# Patient Record
Sex: MALE | URBAN - METROPOLITAN AREA
[De-identification: names, ages, dates, MRNs, and addresses within clinical notes are randomized per-mention and may not be internally consistent; named-entity substitution may affect disease eponyms.]

---

## 2022-05-16 ENCOUNTER — TELEPHONE (OUTPATIENT)
Dept: INTERNAL MEDICINE CLINIC | Facility: CLINIC | Age: 52
End: 2022-05-16

## 2022-09-07 ENCOUNTER — TELEPHONE (OUTPATIENT)
Dept: INTERNAL MEDICINE CLINIC | Facility: CLINIC | Age: 52
End: 2022-09-07

## 2023-02-08 ENCOUNTER — TELEPHONE (OUTPATIENT)
Dept: FAMILY MEDICINE CLINIC | Facility: CLINIC | Age: 53
End: 2023-02-08

## 2023-02-08 NOTE — TELEPHONE ENCOUNTER
Patient is calling and is going to be going into a some form of a assistant living home. He is going to be a new patient and he wants to know if he can have a form filled out by Mica Gautam for him to okay him to be put in the home after being seen by  for the first time or will he need to be seen more than once? Will you be able to call me and let me know the answer to this so that I can help him? He wants me to call him back with the answer to this and assist him with booking his appt.     Ext: [id number]

## 2023-02-08 NOTE — TELEPHONE ENCOUNTER
Spoke to patient, explained to him that we can fill out forms at his visit but if additional test/labs is required the forms will be incomplete until all requirements are completed. Patient verbalized understanding and will make appointment when he is ready to come in.

## 2023-02-21 ENCOUNTER — TELEPHONE (OUTPATIENT)
Dept: FAMILY MEDICINE CLINIC | Facility: CLINIC | Age: 53
End: 2023-02-21

## 2023-02-21 NOTE — TELEPHONE ENCOUNTER
Received  Housing forms which called [employer] at [phone]  to fax to [phone], spoke with Sylvester Drake, forms were previously faxed to wrong number,       Faxed successfully forms to [facility] at [phone] forms sent to scan       Unable to reach pt to inform forms were faxed

## 2023-03-14 ENCOUNTER — MED REC SCAN ONLY (OUTPATIENT)
Dept: FAMILY MEDICINE CLINIC | Facility: CLINIC | Age: 53
End: 2023-03-14

## 2023-04-07 ENCOUNTER — TELEPHONE (OUTPATIENT)
Dept: INTERNAL MEDICINE CLINIC | Facility: CLINIC | Age: 53
End: 2023-04-07

## 2023-08-15 ENCOUNTER — TELEPHONE (OUTPATIENT)
Dept: FAMILY MEDICINE CLINIC | Facility: CLINIC | Age: 53
End: 2023-08-15

## 2023-11-01 ENCOUNTER — TELEPHONE (OUTPATIENT)
Dept: FAMILY MEDICINE CLINIC | Facility: CLINIC | Age: 53
End: 2023-11-01

## 2025-05-17 ENCOUNTER — HOSPITAL ENCOUNTER (EMERGENCY)
Facility: HOSPITAL | Age: 55
Discharge: HOME | End: 2025-05-17
Attending: EMERGENCY MEDICINE
Payer: MEDICARE

## 2025-05-17 VITALS
HEIGHT: 69 IN | TEMPERATURE: 98 F | SYSTOLIC BLOOD PRESSURE: 130 MMHG | HEART RATE: 75 BPM | RESPIRATION RATE: 20 BRPM | OXYGEN SATURATION: 98 % | DIASTOLIC BLOOD PRESSURE: 68 MMHG | BODY MASS INDEX: 20.73 KG/M2 | WEIGHT: 140 LBS

## 2025-05-17 DIAGNOSIS — S81.851A DOG BITE OF RIGHT LOWER LEG, INITIAL ENCOUNTER: Primary | ICD-10-CM
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
## 2025-05-17 PROCEDURE — 99283 EMERGENCY DEPT VISIT LOW MDM: CPT

## 2025-05-17 PROCEDURE — 12005 RPR S/N/A/GEN/TRK12.6-20.0CM: CPT

## 2025-05-17 PROCEDURE — 99284 EMERGENCY DEPT VISIT MOD MDM: CPT

## 2025-05-17 RX ORDER — IBUPROFEN 600 MG/1
600 TABLET, FILM COATED ORAL EVERY 8 HOURS PRN
Qty: 30 TABLET | Refills: 0 | Status: SHIPPED | OUTPATIENT
Start: 2025-05-17 | End: 2025-05-24

## 2025-05-18 NOTE — ED PROVIDER NOTES
Patient Seen in: [facility] Emergency Department    History     Chief Complaint   Patient presents with    Leg or Foot Injury       HPI    History is provided by patient/independent historian: Patient, patient's family  54 year old male with history of hydrocephalus with shunt, resultant right-sided weakness, here with complaints of dog bite to the right leg.  Patient states that he was meeting in a forest with his friends on a dog was there.  He went to go pet him when the dog bit his leg.  He initially went home per patient's family, but when she looked at the wound, she decided to bring him in for further evaluation.  No decreased range of motion.  No numbness.    History reviewed. Past Medical History[1]      History reviewed. Past Surgical History[2]      Home Medications reviewed :  Prescriptions Prior to Admission[3]      History reviewed. Social Hx on file[4]      ROS  Review of Systems   Respiratory:  Negative for shortness of breath.    Cardiovascular:  Negative for chest pain.   Skin:  Positive for wound.   All other systems reviewed and are negative.     All other pertinent organ systems are reviewed and are negative.      Physical Exam     ED Triage Vitals [05/17/25 2029]   /88   Pulse 81   Resp 20   Temp 98 °F (36.7 °C)   Temp src Temporal   SpO2 98 %   O2 Device None (Room air)     Vital signs reviewed.      Physical Exam  Vitals and nursing note reviewed.   Cardiovascular:      Pulses: Normal pulses.   Pulmonary:      Effort: No respiratory distress.   Abdominal:      General: There is no distension.   Musculoskeletal:        Legs:    Neurological:      Mental Status: He is alert.         ED Course       Labs:   Labs Reviewed - No data to display      My EKG Interpretation:   As reviewed and Interpreted by me      Imaging Results Available and Reviewed while in ED:   No results found.    Decision rules/scores evaluated: none      Diagnostic labs/tests considered but not ordered: CBC,  BMP, aerobic culture, tib/fib XR    ED Medications Administered: Medications - No data to display             MDM       Medical Decision Making      Differential Diagnosis: After obtaining the patient's history, performing the physical exam and reviewing the diagnostics, multiple initial diagnoses were considered based on the presenting problem including laceration, fracture, tendon injury, nerve injury    External document review: I personally reviewed available external medical records for any recent pertinent discharge summaries, testing, and procedures - the findings are as follows: 2/21/23 visit with Dr. Chisholm for establishment of care    Complicating Factors: The patient already  has a past medical history of History of brain shunt (1987), Hydrocephalus (HCC), and Trauma. to contribute to the complexity of this ED evaluation.    Procedures performed:   PROCEDURE:  Laceration Repair  : Aman Munguia MD  Length:  5in  Location:  R leg    The patient / caregivers were apprised of diagnostic / treatment options including alternate modes of care, in addition to risks and benefits, for this medical condition. Based on this discussion the patient / caregiver agree with this chosen diagnostic and treatment plan and verbal consent was obtained.    Timeout was performed and the correct patient, site, location was confirmed prior to initiation.  Sterile prep and drape was preformed.  Local analgesia was obtained using lidocaine 1%.  The wound was thoroughly cleansed, irrigated, and explored.  No evidence of foreign body was noted.  I discussed with the patient that there is always a risk of undetected foreign body and if redness, swelling, fever, or increased pain, to return to the ED.  The wound was closed with eight 4-0 prolene sutures with good approximation.  The patient was neurovascularly intact after closure.  No complications were noted.  Sterile dressing applied.    Patient instructed to follow up with  their PMD or return to the ED for suture removal in 10 days.    PROCEDURE:  Laceration Repair  : Aman Munguia MD  Length:  1cm  Location:  R leg    The patient / caregivers were apprised of diagnostic / treatment options including alternate modes of care, in addition to risks and benefits, for this medical condition. Based on this discussion the patient / caregiver agree with this chosen diagnostic and treatment plan and verbal consent was obtained.    Timeout was performed and the correct patient, site, location was confirmed prior to initiation.  Sterile prep and drape was preformed.  Local analgesia was obtained using lidocaine 1%.  The wound was thoroughly cleansed, irrigated, and explored.  No evidence of foreign body was noted.  I discussed with the patient that there is always a risk of undetected foreign body and if redness, swelling, fever, or increased pain, to return to the ED.  The wound was closed with one 4-0 prolene sutures with good approximation.  The patient was neurovascularly intact after closure.  No complications were noted.  Sterile dressing applied.    Patient instructed to follow up with their PMD or return to the ED for suture removal in 10 days.    PROCEDURE:  Laceration Repair  : Aman Munguia MD  Length:  1in  Location:  R leg    The patient / caregivers were apprised of diagnostic / treatment options including alternate modes of care, in addition to risks and benefits, for this medical condition. Based on this discussion the patient / caregiver agree with this chosen diagnostic and treatment plan and verbal consent was obtained.    Timeout was performed and the correct patient, site, location was confirmed prior to initiation.  Sterile prep and drape was preformed.  Local analgesia was obtained using lidocaine 1%.  The wound was thoroughly cleansed, irrigated, and explored.  No evidence of foreign body was noted.  I discussed with the patient that there is always a risk  of undetected foreign body and if redness, swelling, fever, or increased pain, to return to the ED.  The wound was closed with two 4-0 nylon sutures with good approximation.  The patient was neurovascularly intact after closure.  No complications were noted.  Sterile dressing applied.    Patient instructed to follow up with their PMD or return to the ED for suture removal in 10 days.    PROCEDURE:  Laceration Repair  : Aman Munguia MD  Length:  2cm  Location:  R leg    The patient / caregivers were apprised of diagnostic / treatment options including alternate modes of care, in addition to risks and benefits, for this medical condition. Based on this discussion the patient / caregiver agree with this chosen diagnostic and treatment plan and verbal consent was obtained.    Timeout was performed and the correct patient, site, location was confirmed prior to initiation.  Sterile prep and drape was preformed.  Local analgesia was obtained using lidocaine 1%.  The wound was thoroughly cleansed, irrigated, and explored.  No evidence of foreign body was noted.  I discussed with the patient that there is always a risk of undetected foreign body and if redness, swelling, fever, or increased pain, to return to the ED.  The wound was closed with one 4-0 nylon sutures with good approximation.  The patient was neurovascularly intact after closure.  No complications were noted.  Sterile dressing applied.    Patient instructed to follow up with their PMD or return to the ED for suture removal in 10 days.    Discussed management with physician/appropriate source: none    Considered admission/deescalation of care for: none    Social determinants of health affecting patient care: none    Prescription medications considered: augmentin, prescription strength ibuprofen, discussed continuing current medication regimen    The patient requires continuous monitoring for: dog bite    Shared decision making: discussed possible  admission            Disposition and Plan     Clinical Impression:  1. Dog bite of right lower leg, initial encounter        Disposition:  Discharge    Follow-up:  [facility]  [address] [facility] [address]  [phone]    Follow up in 10 day(s)  For suture removal      Medications Prescribed:  Current Discharge Medication List        START taking these medications    Details   amoxicillin clavulanate 875-125 MG Oral Tab Take 1 tablet by mouth 2 (two) times daily for 10 days.  Qty: 20 tablet, Refills: 0      !! ibuprofen 600 MG Oral Tab Take 1 tablet (600 mg total) by mouth every 8 (eight) hours as needed for Pain or Fever.  Qty: 30 tablet, Refills: 0       !! - Potential duplicate medications found. Please discuss with provider.                         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     [2]   Past Surgical History:  Procedure Laterality Date    Other surgical history  01/01/1987    TRACHEOTOMY    Other surgical history      surgery on legs    Other surgical history      surgery on head    Other surgical history      surgery on both feet   [3] (Not in a hospital admission)   [4]   Social History  Socioeconomic History    Marital status: Single   Tobacco Use    Smoking status: Every Day   Substance and Sexual Activity    Alcohol use: Yes     Comment: occ 3-4 a year     Drug use: No   Other Topics Concern    Caffeine Concern Yes     Comment: Soda 6 cups daily

## 2025-05-18 NOTE — ED INITIAL ASSESSMENT (HPI)
Large dog bite to the right lower leg, looks to be a large chunk of skin removed and possible bone exposure. Unknown vaccination status of dog

## 2025-05-18 NOTE — ED QUICK NOTES
Patient to Ct room 34 from  d/t dog bite. Patient states he went walking in the woods because he thought there was a \"hangout\" there. Patient states a dog bit his right leg. Wound to the right leg, minimal bleeding at this time. Patient able to move his right foot. A&Ox3-4.

## 2025-05-19 ENCOUNTER — PATIENT OUTREACH (OUTPATIENT)
Dept: CASE MANAGEMENT | Age: 55
End: 2025-05-19

## 2025-05-19 NOTE — PROGRESS NOTES
NCM attempted to reach the patient to complete a transitions of care call. Left message to call back. [facility] provided direct contact info at [phone].

## 2025-05-20 NOTE — PROGRESS NOTES
Transitions of Care Navigation  Discharge Date: 25  Contact Date: 2025    Transitions of Care Assessment:  BRYNN Initial Assessment    General:  Assessment completed with: Patient  Patient Subjective: Spoke with patient. Patient states he is doing good. Patient states the dog bite on the right lower leg is covered with a sterile dressing. The patient states he plans on removing the dressing today and showering. The patient denies any pain, fevers, or chills. Tolerating his antibiotic and taking Ibuprofen as needed. The patient denies chest pain, shortness of breath, nausea, vomiting, diarrhea. The patient states his girlfriend is home to assist with monitoring the wound and changing the dressing. Patient confirms he was provided wound care supplies by ED provider. The patient denies any questions or concerns.  Chief Complaint: Dog bite of right lower leg, initial encounter  Verify patient name and  with patient/ caregiver: Yes    Hospital Stay/Discharge:  Tell me what you understand of why you were in the hospital or emergency department: a dog bite  Prior to leaving the hospital were your Discharge Instructions reviewed with you?: Yes  Did you receive a copy of your written Discharge Instructions?: Yes  What questions do you have about your Discharge Instructions?: Patient denies  Do you feel better or worse since you left the hospital or emergency department?: Better    Follow - Up Appointment:  Do you have a follow-up appointment?: No  Are there any barriers to getting to your follow-up appointment?: No    Home Health/DME:  Prior to leaving the hospital was Home Health (HH) arranged for you?: N/A  Are HH needs identified by staff during the assessment?: No     Prior to leaving the hospital or emergency department was Durable Medical Equipment (DME), medical supplies, or infusions arranged for you?: Yes (dressing supplies)  Have you received your DME/supplies/infusions?: Yes  Do you have questions about  using your DME/supplies/infusions?: No     Medications/Diet:  Did any of your medications change, during or after your hospital stay or ED visit?: Yes  Do you have your new or updated medications?: Yes  Do you understand what your medications are for and possible side effects?: Yes  Are there any reasons that keep you from taking your medication as prescribed?: No  Any concerns about medication refills?: No    Were you given a different diet per your Discharge Instructions?: No     Questions/Concerns:  Do you have any questions or concerns that have not been discussed?: No           Nursing Interventions:    Patient states Dr. Chisholm is not his PCP it is Dr. Triana although he has not seen Dr. Triana before. LOV w/ Dr. Chisholm was 02/21/2023. I offered follow up with either provider and patient states he wants to establish care with a new provider and is ok with seeing Dr. Triana as he also has Dr. Triana's information on his discharge papers.  ED follow up scheduled for 05/28/2025- will need suture removal as well.   Advised patient to monitor wound closely and provided wound care instructions. Patient understands if he is unable to attend the appointment with Dr. Triana to return to the ER for the suture removal.   NCM provided education on signs/symptoms of infection.   Advised/reviewed importance of completing full course of antibiotic therapy.   All d/c instructions reviewed with pt.  Reviewed when to call MD vs when to go to ER/call 911.  Educated pt on the importance of taking all meds as prescribed as well as close f/u with PCP/specialists.  Pt verbalized understanding and will contact office with any further questions or concerns.       Medications:  Medication Reconciliation:  I am aware of an inpatient discharge within the last 30 days.  The discharge medication list has been reconciled with the patient's current medication list and reviewed by me. See medication list for additions of new medication, and  changes to current doses of medications and discontinued medications.  Current Medications[1]      Follow-up Appointments:      Transitional Care Clinic  Was TCC Ordered: No    Primary Care Provider (If no TCC appointment)  Does patient already have a PCP appointment scheduled? No  Nurse Care Manager Scheduled PCP office ER Follow-up appointment with patient      []  Patient verbally agrees to additional follow-up calls from Nurse Care Manager    Book By Date: 05/24/2025         [1]   Current Outpatient Medications   Medication Sig Dispense Refill    amoxicillin clavulanate 875-125 MG Oral Tab Take 1 tablet by mouth 2 (two) times daily for 10 days. 20 tablet 0    ibuprofen 600 MG Oral Tab Take 1 tablet (600 mg total) by mouth every 8 (eight) hours as needed for Pain or Fever. 30 tablet 0    Acetaminophen-Codeine 300-30 MG Oral Tab Take 1 tablet by mouth 3 (three) times daily as needed.      ibuprofen 800 MG Oral Tab Take 1 tablet (800 mg total) by mouth 3 (three) times daily as needed.      Amitriptyline HCl 50 MG Oral Tab Take 1 tablet (50 mg total) by mouth nightly. 30 tablet 2

## 2025-05-28 ENCOUNTER — OFFICE VISIT (OUTPATIENT)
Dept: INTERNAL MEDICINE CLINIC | Facility: CLINIC | Age: 55
End: 2025-05-28

## 2025-05-28 VITALS
WEIGHT: 141 LBS | DIASTOLIC BLOOD PRESSURE: 65 MMHG | HEIGHT: 69 IN | SYSTOLIC BLOOD PRESSURE: 103 MMHG | HEART RATE: 78 BPM | TEMPERATURE: 98 F | BODY MASS INDEX: 20.88 KG/M2 | OXYGEN SATURATION: 96 %

## 2025-05-28 DIAGNOSIS — S81.811D LEG LACERATION, RIGHT, SUBSEQUENT ENCOUNTER: Primary | ICD-10-CM

## 2025-05-28 PROCEDURE — 99203 OFFICE O/P NEW LOW 30 MIN: CPT | Performed by: INTERNAL MEDICINE

## 2025-05-28 NOTE — PROGRESS NOTES
Subjective:     Patient ID: Dhruv Ray is a 54 year old male.    Suture Removal        History/Other: Here today for follow-up on ER.4 year old male with history of hydrocephalus with shunt, resultant right-sided weakness, here with complaints of dog bite to the right leg.  Patient states that he was meeting in a forest with his friends on a dog was there.  He went to go pet him when the dog bit his leg.  In the ER they sutured the wound they prescribed antibiotic and he was discharged home.  He is here today to remove the sutures  Review of Systems   Constitutional: Negative.    HENT: Negative.     Eyes: Negative.    Respiratory: Negative.     Cardiovascular: Negative.    Gastrointestinal: Negative.  Negative for abdominal distention.   Endocrine: Negative.    Genitourinary: Negative.    Musculoskeletal: Negative.    Neurological: Negative.    Hematological: Negative.    Psychiatric/Behavioral: Negative.       Current Medications[1]  Allergies:Allergies[2]    Past Medical History[3]   Past Surgical History[4]   Family History[5]   Social History: Short Social Hx on File[6]     Objective:   Physical Exam  Vitals and nursing note reviewed.   Constitutional:       Appearance: Normal appearance.   HENT:      Head: Normocephalic and atraumatic.   Cardiovascular:      Rate and Rhythm: Normal rate and regular rhythm.      Pulses: Normal pulses.      Heart sounds: Normal heart sounds.   Pulmonary:      Effort: Pulmonary effort is normal.      Breath sounds: Normal breath sounds.   Abdominal:      Palpations: Abdomen is soft.   Musculoskeletal:      Cervical back: Normal range of motion and neck supple.   Skin:     Comments: Laceration of the right lower leg   Neurological:      Mental Status: He is alert. Mental status is at baseline.   Psychiatric:         Mood and Affect: Mood normal.         Assessment & Plan:   1. Leg laceration, right, subsequent encounter      S/p suturing, wound healed well, stiches removed , ,  he finished abx , monitor ,   No orders of the defined types were placed in this encounter.      Meds This Visit:  Requested Prescriptions      No prescriptions requested or ordered in this encounter       Imaging & Referrals:  None            [1]   No current outpatient medications on file.   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     [4]   Past Surgical History:  Procedure Laterality Date    Other surgical history  01/01/1987    TRACHEOTOMY    Other surgical history      surgery on legs    Other surgical history      surgery on head    Other surgical history      surgery on both feet   [5] No family history on file.  [6]   Social History  Socioeconomic History    Marital status: Single   Tobacco Use    Smoking status: Every Day   Substance and Sexual Activity    Alcohol use: Yes     Comment: occ 3-4 a year     Drug use: No   Other Topics Concern    Caffeine Concern Yes     Comment: Soda 6 cups daily

## 2025-06-17 ENCOUNTER — NURSE TRIAGE (OUTPATIENT)
Dept: FAMILY MEDICINE CLINIC | Facility: CLINIC | Age: 55
End: 2025-06-17

## 2025-06-17 NOTE — TELEPHONE ENCOUNTER
Action Requested: Summary for Provider     []  Critical Lab, Recommendations Needed  [] Need Additional Advice  [x]   FYI    []   Need Orders  [] Need Medications Sent to Pharmacy  []  Other     SUMMARY:   Spoke with patient, Date of Birth verified  He stated last week he was walking and he sprain his Rt foot.   Now he complaint of pain with current rate 5/10, swelling, redness.  He can barely walk, he is using cane.  He tried over the counter Tylenol/ Advil it help temporary.  Pt denies chest pain, shortness of breath, fever, numbness/ tingling, no other sx.   He was advised if sx persist or gets worse to go to ER/ IC, he stated understanding.   He is looking for appt for this week as he need to call and arrange medical cab.   Appt made with Chante MUÑOZ for eval & treat.        Reason for call: foot pain  Onset: a week        Reason for Disposition   Patient wants to be seen    Protocols used: Foot Pain-A-[place]      Future Appointments   Date Time Provider Department Center   6/18/2025  5:00 PM Chante Weir APRN [facility]

## 2025-06-18 ENCOUNTER — OFFICE VISIT (OUTPATIENT)
Dept: INTERNAL MEDICINE CLINIC | Facility: CLINIC | Age: 55
End: 2025-06-18
Payer: MEDICARE

## 2025-06-18 VITALS
SYSTOLIC BLOOD PRESSURE: 124 MMHG | BODY MASS INDEX: 20.14 KG/M2 | HEIGHT: 69 IN | OXYGEN SATURATION: 96 % | TEMPERATURE: 98 F | DIASTOLIC BLOOD PRESSURE: 68 MMHG | HEART RATE: 96 BPM | WEIGHT: 136 LBS

## 2025-06-18 DIAGNOSIS — M79.89 FOOT SWELLING: ICD-10-CM

## 2025-06-18 DIAGNOSIS — S93.401S SPRAIN OF RIGHT ANKLE, UNSPECIFIED LIGAMENT, SEQUELA: Primary | ICD-10-CM

## 2025-06-18 DIAGNOSIS — S81.811D LEG LACERATION, RIGHT, SUBSEQUENT ENCOUNTER: ICD-10-CM

## 2025-06-18 PROCEDURE — 99214 OFFICE O/P EST MOD 30 MIN: CPT | Performed by: INTERNAL MEDICINE

## 2025-06-18 PROCEDURE — 3008F BODY MASS INDEX DOCD: CPT | Performed by: INTERNAL MEDICINE

## 2025-06-18 PROCEDURE — 3074F SYST BP LT 130 MM HG: CPT | Performed by: INTERNAL MEDICINE

## 2025-06-18 PROCEDURE — 3078F DIAST BP <80 MM HG: CPT | Performed by: INTERNAL MEDICINE

## 2025-06-18 RX ORDER — MELOXICAM 15 MG/1
15 TABLET ORAL DAILY
Qty: 15 TABLET | Refills: 0 | Status: SHIPPED | OUTPATIENT
Start: 2025-06-18

## 2025-06-18 NOTE — PROGRESS NOTES
The following individual(s) verbally consented to be recorded using ambient AI listening technology and understand that they can each withdraw their consent to this listening technology at any point by asking the clinician to turn off or pause the recording: Yes    Patient name: Dhruv Ray  Additional names:

## 2025-06-18 NOTE — PROGRESS NOTES
Dhruv Ray is a 54 year old male.  Chief Complaint   Patient presents with    Foot Pain     Pain and swelling in right foot after being bit by an animal     HPI:        Dhruv Ray is a 54 year old male who presents with a twisted foot and ankle pain after a fall.    He twisted his foot while walking on his porch one week ago, resulting in swelling and pain in the foot and ankle. The pain worsens with walking, making it difficult for him to move comfortably. There is no current concern regarding a previous dog bite, as the wound has healed without pain or swelling. He had a shunt placed approximately 40 years ago.       Current Medications[1]   Past Medical History[2]   Past Surgical History[3]   Social History:  Short Social Hx on File[4]   Family History[5]   Allergies[6]     REVIEW OF SYSTEMS:   Review of Systems   Review of Systems   Constitutional: Negative for activity change, appetite change and fever.   HENT: Negative for congestion and voice change.    Respiratory: Negative for cough and shortness of breath.    Cardiovascular: Negative for chest pain.   Gastrointestinal: Negative for abdominal distention, abdominal pain and vomiting.   Genitourinary: Negative for hematuria.   Skin: healed wound  Psychiatric/Behavioral: Negative for behavioral problems.   Wt Readings from Last 5 Encounters:   06/18/25 136 lb (61.7 kg)   05/28/25 141 lb (64 kg)   05/17/25 140 lb (63.5 kg)   02/21/23 137 lb 12.8 oz (62.5 kg)   06/30/16 133 lb (60.3 kg)     Body mass index is 20.08 kg/m².      EXAM:   /68   Pulse 96   Temp 97.8 °F (36.6 °C) (Oral)   Ht 5' 9\" (1.753 m)   Wt 136 lb (61.7 kg)   SpO2 96%   BMI 20.08 kg/m²   Physical Exam   Constitutional:       Appearance: Normal appearance.   HENT:      Head: Normocephalic. .  Shunt present  Cardiovascular:      Rate and Rhythm: Normal rate and regular rhythm.      Heart sounds: Normal heart sounds. No murmur heard.  Pulmonary:      Effort: Pulmonary effort is  normal.      Breath sounds: Normal breath sounds. No rhonchi or rales.   Abdominal:      General: Bowel sounds are normal.      Palpations: Abdomen is soft.      Tenderness: There is no abdominal tenderness.   Musculoskeletal:      Cervical back: Neck supple.       Left lower leg: No edema.   Skin:     General: Skin is warm and dry.   Neurological:      General: No focal deficit present.      Mental Status: He is alert and oriented to person, place, and time. Mental status is at baseline.   Psychiatric:         Mood and Affect: Mood normal.         Behavior: Behavior normal.   Laceration in the right lower extremity healed with no drainage.  Ankle swelling present foot swelling present able to weight-bear    ASSESSMENT AND PLAN:   1. Sprain of right ankle, unspecified ligament, sequela    - XR ANKLE (MIN 3 VIEWS), RIGHT (CPT=73610); Future  - XR FOOT (2 VIEW), RIGHT (CPT=73620); Future  - Podiatry Referral - In Network    2. Foot swelling  Most likely sequelae of ankle sprain.  Will get x-ray of the ankle and the foot.  Ice it, Ace wrap it podiatry referral given.  Meloxicam to reduce pain and inflammation.  - XR ANKLE (MIN 3 VIEWS), RIGHT (CPT=73610); Future  - XR FOOT (2 VIEW), RIGHT (CPT=73620); Future  - Podiatry Referral - In Network    3. Leg laceration, right, subsequent encounter     Ankle Sprain  Acute ankle sprain with swelling and pain. X-ray needed to rule out fracture.  - Order x-ray of ankle and foot.  - Advise icing.  - Instruct use of ACE wrap.  - Prescribe pain medication once daily with food.  - Provide podiatrist contact if no improvement.    Dog Bite  Wound healed, no infection or complications.    Follow-up  Will contact with x-ray results. Further follow-up based on findings and recovery.  - Contact with x-ray results.       Plan: As above.  Encourage patient to follow-up with the PCP and do age-appropriate screenings and blood testing.      The patient indicates understanding of these issues  and agrees to the plan.  No follow-ups on file.    This note was prepared using Dragon Medical voice recognition dictation software. As a result errors may occur. When identified these errors have been corrected. While every attempt is made to correct errors during dictation discrepancies may still exist.       [1]   Current Outpatient Medications   Medication Sig Dispense Refill    Meloxicam 15 MG Oral Tab Take 1 tablet (15 mg total) by mouth daily. With food 15 tablet 0   [2]   Past Medical History:   History of brain shunt    Hydrocephalus (HCC)    Trauma    age 16 coma and brain damage.     [3]   Past Surgical History:  Procedure Laterality Date    Other surgical history  01/01/1987    TRACHEOTOMY    Other surgical history      surgery on legs    Other surgical history      surgery on head    Other surgical history      surgery on both feet   [4]   Social History  Socioeconomic History    Marital status: Single   Tobacco Use    Smoking status: Every Day   Vaping Use    Vaping status: Never Used   Substance and Sexual Activity    Alcohol use: Yes     Comment: occ 3-4 a year     Drug use: No   Other Topics Concern    Caffeine Concern Yes     Comment: Soda 6 cups daily   [5] History reviewed. No pertinent family history.  [6] No Known Allergies

## 2025-06-19 ENCOUNTER — HOSPITAL ENCOUNTER (OUTPATIENT)
Dept: GENERAL RADIOLOGY | Age: 55
Discharge: HOME | End: 2025-06-19
Attending: INTERNAL MEDICINE
Payer: MEDICARE

## 2025-06-19 DIAGNOSIS — M79.89 FOOT SWELLING: ICD-10-CM

## 2025-06-19 DIAGNOSIS — S93.401S SPRAIN OF RIGHT ANKLE, UNSPECIFIED LIGAMENT, SEQUELA: ICD-10-CM

## 2025-06-19 PROCEDURE — 73610 X-RAY EXAM OF ANKLE: CPT | Performed by: INTERNAL MEDICINE

## 2025-06-19 PROCEDURE — 73630 X-RAY EXAM OF FOOT: CPT | Performed by: INTERNAL MEDICINE

## 2025-06-24 ENCOUNTER — TELEPHONE (OUTPATIENT)
Dept: INTERNAL MEDICINE CLINIC | Facility: CLINIC | Age: 55
End: 2025-06-24

## 2025-06-24 NOTE — TELEPHONE ENCOUNTER
Managed care, please assist with referral.  Ordered 6/18, but status is still \"OPEN\"  Patient provided with podiatry information.    Thanks      ----- Message -----  From: Sean Yarbrough MD  Sent: 6/23/2025   4:11 PM CDT  To: Em Rn Triage    To see podiatry  JA

## 2025-06-25 NOTE — TELEPHONE ENCOUNTER
Patient: HARRY STORM [[id number]] MRN: [id number]   Status: Authorized Type: OFFICE VISIT   Class: Internal Reasons:     Diagnosis: S93.401S (ICD-10-CM) - 905.7 (ICD-9-CM) - Sprain of right ankle, unspecified ligament, sequela  M79.89 (ICD-10-CM) - 729.81 (ICD-9-CM) - Foot swelling Procedures: [id number] - PODIATRY - INTERNAL   Start: Jun 18, 2025 Expiration: Jun 18, 2026   Requested: 1 Authorized: 1   Scheduled:   Completed:     Authorization #:   Precertification #:     Referring Location:  [facility] Referred to Location:     Referring Department: [facility] Referred To Department:     Referring Provider: KAILYN BLAS Referred To Provider: GEE FORD

## 2025-07-02 ENCOUNTER — APPOINTMENT (OUTPATIENT)
Dept: GENERAL RADIOLOGY | Facility: HOSPITAL | Age: 55
End: 2025-07-02
Attending: EMERGENCY MEDICINE
Payer: MEDICARE

## 2025-07-02 ENCOUNTER — APPOINTMENT (OUTPATIENT)
Dept: ULTRASOUND IMAGING | Facility: HOSPITAL | Age: 55
End: 2025-07-02
Attending: EMERGENCY MEDICINE
Payer: MEDICARE

## 2025-07-02 ENCOUNTER — HOSPITAL ENCOUNTER (EMERGENCY)
Facility: HOSPITAL | Age: 55
Discharge: HOME | End: 2025-07-02
Attending: EMERGENCY MEDICINE
Payer: MEDICARE

## 2025-07-02 ENCOUNTER — NURSE TRIAGE (OUTPATIENT)
Dept: INTERNAL MEDICINE CLINIC | Facility: CLINIC | Age: 55
End: 2025-07-02

## 2025-07-02 ENCOUNTER — HOSPITAL ENCOUNTER (OUTPATIENT)
Age: 55
Discharge: ANOTHER HOSPITAL | End: 2025-07-02
Payer: MEDICARE

## 2025-07-02 VITALS
RESPIRATION RATE: 16 BRPM | TEMPERATURE: 99 F | HEART RATE: 64 BPM | BODY MASS INDEX: 20.73 KG/M2 | OXYGEN SATURATION: 96 % | HEIGHT: 69 IN | DIASTOLIC BLOOD PRESSURE: 71 MMHG | WEIGHT: 140 LBS | SYSTOLIC BLOOD PRESSURE: 119 MMHG

## 2025-07-02 VITALS
SYSTOLIC BLOOD PRESSURE: 119 MMHG | OXYGEN SATURATION: 97 % | TEMPERATURE: 98 F | RESPIRATION RATE: 18 BRPM | HEART RATE: 85 BPM | DIASTOLIC BLOOD PRESSURE: 70 MMHG

## 2025-07-02 DIAGNOSIS — M79.89 PAIN AND SWELLING OF RIGHT LOWER LEG: Primary | ICD-10-CM

## 2025-07-02 DIAGNOSIS — S82.301A CLOSED EXTRA-ARTICULAR FRACTURE OF DISTAL END OF RIGHT TIBIA, INITIAL ENCOUNTER: Primary | ICD-10-CM

## 2025-07-02 DIAGNOSIS — M79.661 PAIN AND SWELLING OF RIGHT LOWER LEG: Primary | ICD-10-CM

## 2025-07-02 LAB
ANION GAP SERPL CALC-SCNC: 5 MMOL/L (ref 0–18)
BASOPHILS # BLD AUTO: 0.06 X10(3) UL (ref 0–0.2)
BASOPHILS NFR BLD AUTO: 0.9 %
BUN BLD-MCNC: 6 MG/DL (ref 9–23)
BUN/CREAT SERPL: 7.1 (ref 10–20)
CALCIUM BLD-MCNC: 9.1 MG/DL (ref 8.7–10.4)
CHLORIDE SERPL-SCNC: 103 MMOL/L (ref 98–112)
CO2 SERPL-SCNC: 26 MMOL/L (ref 21–32)
CREAT BLD-MCNC: 0.85 MG/DL (ref 0.7–1.3)
DEPRECATED RDW RBC AUTO: 46.2 FL (ref 35.1–46.3)
EGFRCR SERPLBLD CKD-EPI 2021: 103 ML/MIN/1.73M2 (ref 60–?)
EOSINOPHIL # BLD AUTO: 0.37 X10(3) UL (ref 0–0.7)
EOSINOPHIL NFR BLD AUTO: 5.8 %
ERYTHROCYTE [DISTWIDTH] IN BLOOD BY AUTOMATED COUNT: 12.9 % (ref 11–15)
GLUCOSE BLD-MCNC: 82 MG/DL (ref 70–99)
HCT VFR BLD AUTO: 44.9 % (ref 39–53)
HGB BLD-MCNC: 15.2 G/DL (ref 13–17.5)
IMM GRANULOCYTES # BLD AUTO: 0.04 X10(3) UL (ref 0–1)
IMM GRANULOCYTES NFR BLD: 0.6 %
LYMPHOCYTES # BLD AUTO: 1.89 X10(3) UL (ref 1–4)
LYMPHOCYTES NFR BLD AUTO: 29.4 %
MCH RBC QN AUTO: 32.5 PG (ref 26–34)
MCHC RBC AUTO-ENTMCNC: 33.9 G/DL (ref 31–37)
MCV RBC AUTO: 95.9 FL (ref 80–100)
MONOCYTES # BLD AUTO: 0.66 X10(3) UL (ref 0.1–1)
MONOCYTES NFR BLD AUTO: 10.3 %
NEUTROPHILS # BLD AUTO: 3.41 X10 (3) UL (ref 1.5–7.7)
NEUTROPHILS # BLD AUTO: 3.41 X10(3) UL (ref 1.5–7.7)
NEUTROPHILS NFR BLD AUTO: 53 %
OSMOLALITY SERPL CALC.SUM OF ELEC: 275 MOSM/KG (ref 275–295)
PLATELET # BLD AUTO: 166 10(3)UL (ref 150–450)
POTASSIUM SERPL-SCNC: 3.9 MMOL/L (ref 3.5–5.1)
RBC # BLD AUTO: 4.68 X10(6)UL (ref 4.3–5.7)
SODIUM SERPL-SCNC: 134 MMOL/L (ref 136–145)
WBC # BLD AUTO: 6.4 X10(3) UL (ref 4–11)

## 2025-07-02 PROCEDURE — 36415 COLL VENOUS BLD VENIPUNCTURE: CPT

## 2025-07-02 PROCEDURE — 99285 EMERGENCY DEPT VISIT HI MDM: CPT

## 2025-07-02 PROCEDURE — 99215 OFFICE O/P EST HI 40 MIN: CPT | Performed by: NURSE PRACTITIONER

## 2025-07-02 PROCEDURE — 29515 APPLICATION SHORT LEG SPLINT: CPT

## 2025-07-02 PROCEDURE — 73610 X-RAY EXAM OF ANKLE: CPT | Performed by: RADIOLOGY

## 2025-07-02 PROCEDURE — 93971 EXTREMITY STUDY: CPT | Performed by: RADIOLOGY

## 2025-07-02 PROCEDURE — 80048 BASIC METABOLIC PNL TOTAL CA: CPT | Performed by: EMERGENCY MEDICINE

## 2025-07-02 PROCEDURE — 85025 COMPLETE CBC W/AUTO DIFF WBC: CPT | Performed by: EMERGENCY MEDICINE

## 2025-07-02 PROCEDURE — 73630 X-RAY EXAM OF FOOT: CPT | Performed by: RADIOLOGY

## 2025-07-02 PROCEDURE — 99284 EMERGENCY DEPT VISIT MOD MDM: CPT

## 2025-07-02 NOTE — ED PROVIDER NOTES
Patient Seen in: [facility]        History  Chief Complaint   Patient presents with    Swelling     Stated Complaint: right foot pain    Subjective:   HPI            This is a 54-year-old male with a history of a brain shunt and hydrocephalus presenting with right lower leg pain swelling and redness.  Patient states that he was bit by dog on his right lower leg in May had stitches was on antibiotics had the stitches out and no issues.  Patient states he has been after that about 2 weeks ago twisted the ankle and the foot was seen by his doctor had x-rays done but has continued to have pain and swelling and redness in the lower legs we contacted his doctor and was told to come here for evaluation.  Denies chest pain or shortness of breath.      Objective:     No pertinent past medical history.            No pertinent past surgical history.              No pertinent social history.            Review of Systems    Positive for stated complaint: right foot pain  Other systems are as noted in HPI.  Constitutional and vital signs reviewed.      All other systems reviewed and negative except as noted above.                  Physical Exam    ED Triage Vitals [07/02/25 1718]   /70   Pulse 85   Resp 18   Temp 98.1 °F (36.7 °C)   Temp src Oral   SpO2 97 %   O2 Device None (Room air)       Current Vitals:   Vital Signs  BP: 119/70  Pulse: 85  Resp: 18  Temp: 98.1 °F (36.7 °C)  Temp src: Oral    Oxygen Therapy  SpO2: 97 %  O2 Device: None (Room air)            Physical Exam  Vitals and nursing note reviewed.   Constitutional:       Appearance: Normal appearance.   HENT:      Right Ear: External ear normal.      Left Ear: External ear normal.      Nose: Nose normal.      Mouth/Throat:      Mouth: Mucous membranes are moist.      Pharynx: Oropharynx is clear.   Eyes:      Conjunctiva/sclera: Conjunctivae normal.   Cardiovascular:      Rate and Rhythm: Normal rate.   Pulmonary:      Effort: Pulmonary effort is  normal.   Musculoskeletal:         General: Normal range of motion.      Cervical back: Normal range of motion.        Legs:    Skin:     General: Skin is warm.      Capillary Refill: Capillary refill takes less than 2 seconds.   Neurological:      General: No focal deficit present.      Mental Status: He is alert and oriented to person, place, and time.                 ED Course  Labs Reviewed - No data to display                         MDM          Medical Decision Making  54-year-old male presenting with right lower leg pain swelling and redness initially had a dog bite in May but then sustained an injury 2 weeks ago had x-rays but has continued to have pain and swelling and redness in the lower extremity DDx DVT versus cellulitis versus PAD versus ankle sprain or strain discussed possible diagnoses with the patient discussed transfer to the emergency department for evaluation discussed risk factors of not going such as worsening symptoms debilitating disease or death.  Patient states he understands the risk factors and will go to [facility] emergency department.    Amount and/or Complexity of Data Reviewed  Discussion of management or test interpretation with external provider(s): This patient was discussed with my attending Dr. Allred who agrees with this provider's management and plan of care.        Disposition and Plan     Clinical Impression:  1. Pain and swelling of right lower leg         Disposition:  Ic to ed  7/2/2025  5:25 pm    Follow-up:  No follow-up provider specified.        Medications Prescribed:  Discharge Medication List as of 7/2/2025  5:25 PM                Supplementary Documentation:

## 2025-07-02 NOTE — ED INITIAL ASSESSMENT (HPI)
Dog bite to right lower foot about 1 month ago. Tripped on a step recently and leg began to swell. Had XR performed at urgent care which did not show any fx.    No

## 2025-07-02 NOTE — TELEPHONE ENCOUNTER
Please reply to pool: EM RN TRIAGE  Action Requested: Summary for Provider     []  Critical Lab, Recommendations Needed  [] Need Additional Advice  [x]   FYI    []   Need Orders  [] Need Medications Sent to Pharmacy  []  Other     SUMMARY: Patient contacts clinic reporting swelling up his leg to the knee.  Seen in office 06/18 for an ankle sprain and laceration s/p fall.  Also had a recent dog bite to that extremity. Edema is much worse and traveling up leg.  He still has bruising present.  Denies skin redness or heat.  Denies chest pain or shortness of breath.  Nurse recommended immediate care evaluation to rule out DVT or cellulitis. Patient agreed and will go.     Reason for call: Leg Swelling  Onset: Data Unavailable                       Reason for Disposition   Thigh, calf, or ankle swelling in only one leg    Protocols used: Leg Swelling and Edema-A-OH

## 2025-07-03 ENCOUNTER — TELEPHONE (OUTPATIENT)
Dept: ORTHOPEDICS CLINIC | Facility: CLINIC | Age: 55
End: 2025-07-03

## 2025-07-03 DIAGNOSIS — M25.571 ACUTE RIGHT ANKLE PAIN: Primary | ICD-10-CM

## 2025-07-03 NOTE — TELEPHONE ENCOUNTER
Patient was seen in [place] ED on 7/2 for a right ankle fx. Xrays in Epic. Please advise when he can be seen. Patient prefers [place] location.   [phone] (home)

## 2025-07-03 NOTE — ED PROVIDER NOTES
Patient Seen in: [facility] Emergency Department        History  Chief Complaint   Patient presents with    Pain     Stated Complaint: pain in RLE    Subjective:   HPI            54-year-old male with history of traumatic brain injury, hydrocephalus, and status post  shunt presents with complaints of pain and swelling to his right distal leg, ankle, and foot.  The patient bit by a dog to his right leg and ankle in mid May.  He was treated with antibiotics and slowly improved with resolution of his symptoms.  He then injured his right ankle with an inversion injury around 6/11.  He had x-rays done at the time that showed no fracture and was diagnosed with an ankle sprain.  He seemed to recover from the injury as well but over the past 3 days he has had increased redness, swelling, and pain to his ankle and foot.  He denies any new injuries.  He called his primary physician today and was advised to go to immediate care.  He went to immediate care and was subsequently sent to the ED for evaluation.  He denies any fevers.  History is obtained from both the patient and his fiancée at the bedside as the patient has some communication difficulties secondary to his traumatic brain injury.      Objective:     Past Medical History:    History of brain shunt    Hydrocephalus (HCC)    Trauma    age 16 coma and brain damage.                Past Surgical History:   Procedure Laterality Date    Other surgical history  01/01/1987    TRACHEOTOMY    Other surgical history      surgery on legs    Other surgical history      surgery on head    Other surgical history      surgery on both feet                Social History     Socioeconomic History    Marital status: Single   Tobacco Use    Smoking status: Every Day   Vaping Use    Vaping status: Never Used   Substance and Sexual Activity    Alcohol use: Yes     Comment: occ 3-4 a year     Drug use: No   Other Topics Concern    Caffeine Concern Yes     Comment: Soda 6 cups  daily                                Physical Exam    ED Triage Vitals [07/02/25 1825]   /72   Pulse 80   Resp 18   Temp 98.6 °F (37 °C)   Temp src Temporal   SpO2 99 %   O2 Device None (Room air)       Current Vitals:   Vital Signs  BP: 113/72  Pulse: 80  Resp: 18  Temp: 98.6 °F (37 °C)  Temp src: Temporal    Oxygen Therapy  SpO2: 99 %  O2 Device: None (Room air)            Physical Exam    General Appearance: alert, no distress  Eyes: pupils equal and round no pallor or injection  ENT, Mouth: mucous membranes moist  Respiratory: there are no retractions, lungs are clear to auscultation  Cardiovascular: regular rate and rhythm  Gastrointestinal:  abdomen is soft and non tender, no masses, bowel sounds normal  Neurological: Speech clear but slow.  Weakness to the right upper and lower extremity (baseline, per patient).  Skin: Erythema and warmth noted to the distal third of the right leg extending to the ankle and dorsum of the foot.  Healing bite wounds noted to the distal leg and ankle.  No drainage.  Musculoskeletal: neck is supple non tender        Swelling noted to the distal leg, ankle, and foot on the right.  The patient has full range of motion of the ankle with mild tenderness.  There is tenderness to palpation to the leg, ankle, and foot  Psychiatric: patient is oriented X 3, there is no agitation    DIFFERENTIAL DIAGNOSIS: After history and physical exam differential diagnosis was considered for cellulitis, deep vein thrombosis, occult injury, or other            ED Course  Labs Reviewed   BASIC METABOLIC PANEL (8) - Abnormal; Notable for the following components:       Result Value    Sodium 134 (*)     BUN 6 (*)     BUN/CREA Ratio 7.1 (*)     All other components within normal limits   CBC WITH DIFFERENTIAL WITH PLATELET                          MDM     Lab, x-ray, and ultrasound results noted.  Patient with distal tibial fracture.  Possible occult recent injury versus difficulty visualizing the  nondisplaced fracture on the previous x-rays from 6/19.  Will place the patient in a short leg splint and discharged home with orthopedic follow-up.    Procedure:    A short leg splint was applied.  After application of the splint I returned and re-examined the patient.  The splint was adequately immobilizing the joint and distal to the splint the patient's circulation and sensation was intact.          Medical Decision Making      Disposition and Plan     Clinical Impression:  1. Closed extra-articular fracture of distal end of right tibia, initial encounter         Disposition:  Discharge  7/2/2025 10:42 pm    Follow-up:  Isra Garcia MD  [address] [address]  [phone]    Follow up            Medications Prescribed:  Current Discharge Medication List                Supplementary Documentation:

## 2025-07-03 NOTE — DISCHARGE INSTRUCTIONS
Keep splint in place.  Follow-up with orthopedics for further evaluation and treatment.  Do not bear weight on your right foot until cleared to do so by orthopedics.  Take ibuprofen or Tylenol as needed for pain.  Elevate your leg when possible.  Return to the emergency department if increasing pain, numbness, or other new symptoms develop.

## 2025-07-07 NOTE — TELEPHONE ENCOUNTER
Spoke with patient and let him know that provider would like him to get a CT csn to determine if a surgical case or not. He is amenable to this plan. Advised we will go from there with scheduling appointment and I would let him know when order placed

## 2025-07-08 ENCOUNTER — HOSPITAL ENCOUNTER (OUTPATIENT)
Dept: CT IMAGING | Age: 55
Discharge: HOME | End: 2025-07-08
Attending: PODIATRIST
Payer: MEDICARE

## 2025-07-08 DIAGNOSIS — M25.571 ACUTE RIGHT ANKLE PAIN: ICD-10-CM

## 2025-07-08 PROCEDURE — 73700 CT LOWER EXTREMITY W/O DYE: CPT | Performed by: PODIATRIST

## 2025-07-09 ENCOUNTER — RESULTS FOLLOW-UP (OUTPATIENT)
Dept: ORTHOPEDICS CLINIC | Facility: CLINIC | Age: 55
End: 2025-07-09

## 2025-07-09 NOTE — TELEPHONE ENCOUNTER
Spoke w/ pt.   Pt name and  verified.  Pt informed CT results from Dr Jimenez and scheduled for visit  at 3:30.  Pt given address and location instructions.  Pt will arrange for transportation.   Pt verbalized understanding.      ----- Message from Jade Jimenez sent at 2025  6:48 AM CDT -----  Please call patient and have him just set up appointment to be seen next week to put on a cast.  I reviewed his CT but I have not evaluated the patient yet.  In summary, per his CT results I do not   think he needs surgical intervention however would like to review risk and benefits with him as well as potentially play cast in person.  ----- Message -----  From: Donovan Hu In  Sent: 2025   7:20 PM CDT  To: Jade Jimenez DPM

## 2025-07-14 ENCOUNTER — TELEPHONE (OUTPATIENT)
Facility: CLINIC | Age: 55
End: 2025-07-14

## 2025-07-14 NOTE — TELEPHONE ENCOUNTER
Per SARAH quezada to schedule patient on 07/16 at 11:50. Spoke with patient and confirmed this appointment. Patient aware appointment is at the [place] office, gave address and patient stated understanding.

## 2025-07-14 NOTE — TELEPHONE ENCOUNTER
Patient called to state he does not have transportation for his 3:30pm appointment. Patient asked to cancel an reschedule for Wednesday.   Informed pt we will check with Dr. Jimenez to see when she can fit him in again.

## 2025-07-16 ENCOUNTER — OFFICE VISIT (OUTPATIENT)
Dept: PODIATRY CLINIC | Facility: CLINIC | Age: 55
End: 2025-07-16

## 2025-07-16 DIAGNOSIS — S82.234A CLOSED NONDISPLACED OBLIQUE FRACTURE OF SHAFT OF RIGHT TIBIA, INITIAL ENCOUNTER: Primary | ICD-10-CM

## 2025-07-16 PROCEDURE — 99204 OFFICE O/P NEW MOD 45 MIN: CPT | Performed by: PODIATRIST

## 2025-07-16 NOTE — PROGRESS NOTES
Reason for Visit      Dhruv Ray is a 54 year old male presents today complaining of right ankle injury.     History of Present Illness     Patient presents to clinic today after being seen in the emergency room on 7/2/2025 complaining of acute right ankle pain.  Patient states he was bit by his dog in his right leg and ankle in mid May stated he was treated with antibiotics and slowly improved with resolution of symptoms.  Patient states he then reinjured his right ankle with inversion injury around 611 he had x-rays done at that time which showed no fracture was diagnosed with ankle sprain.  He seemed to recover from the injury as well but over the past 3 days he has had increased redness swelling and pain to the ankle foot.  Denies any new injury.  Radiographs were taken at that time which noted concern for a possible nondisplaced fracture of the distal tibia.  Patient was placed in a splint and told to follow-up with specialty.  Patient presents to clinic today walking on a posterior splint    The following portions of the patient's history were reviewed and updated as appropriate: allergies, current medications, past family history, past medical history, past social history, past surgical history and problem list.    Allergies[1]    Medications - Current[2]    There are no discontinued medications.    Problem List[3]    Past Medical History[4]    Past Surgical History[5]    Family History[6]    Social History     Occupational History    Not on file   Tobacco Use    Smoking status: Every Day    Smokeless tobacco: Not on file   Vaping Use    Vaping status: Never Used   Substance and Sexual Activity    Alcohol use: Yes     Comment: occ 3-4 a year     Drug use: No    Sexual activity: Not on file       ROS      Constitutional: negative for chills, fevers and sweats  Gastrointestinal: negative for abdominal pain, diarrhea, nausea and vomiting  Genitourinary:negative for dysuria and  hematuria  Musculoskeletal:negative for arthralgias and muscle weakness  Neurological: negative for paresthesia and weakness  All others reviewed and negative.    Physical Exam     LE PHYSICAL EXAM    Constitution: Well-developed and well-nourished. Gait appears normal. No apparent distress. Alert and oriented to person, place, and time.  Integument: There are no varicosities. Skin appears moist, warm, and supple with positive hair growth. There are no color changes. No open lesions. No macerations, No Hyperkeratotic lesions.  Vascular examination: Dorsalis pedis and posterior tibial pulses are strong bilaterally with capillary filling time less than 3 seconds to all digits. There is no peripheral edema..  Neurological Sensorium: Grossly intact to sharp/dull. Vibratory: Intact.  Musculoskeletal:   5/5 pedal muscle strength b/l       Edema circumferentially around the distal tibia.  No calf pain noted no open lesions no ascending cellulitis signs infection noted.  Previous dog bite appears to be well adhered with no signs of infection noted.  Patient able dorsiflex plantarflex invert sj single joint without any pain.  No pain to palpation with compression of the syndesmosis.  No pain on palpation to the fibular head    Assessment and Plan     Encounter Diagnoses   Name Primary?    Closed nondisplaced oblique fracture of shaft of right tibia, initial encounter Yes   Reviewed patient's x-rays and CT in great detail.  Discussed that none displaced nature of the fracture.  Discussed protection immobilization for minimum of 8 more weeks considering the fact he has been walking on it.  Fitted patient for a walking boot at today's office visit.  Patient to wear consistently even when sleeping for 4 weeks we will follow-up with repeat x-rays and plan to either return to normal activity or transition to physical therapy.    Patient was instructed to call the office or on-call podiatric physician immediately with any issues  or concerns before the next scheduled visit. Patient to follow-up in clinic in 4 weeks      Jade Walden DPM, D.DAVON, SANTINO  Diplomat, American Board of Foot and Ankle Surgery  Certified in Foot and Rearfoot/Ankle Reconstruction  Fellow of the American College of Foot and Ankle Surgeons  Fellowship Trained Foot and Ankle Surgeon   [facility]     7/16/2025    12:03 PM         [1] No Known Allergies  [2]   Current Outpatient Medications:     Meloxicam 15 MG Oral Tab, Take 1 tablet (15 mg total) by mouth daily. With food, Disp: 15 tablet, Rfl: 0  [3]   Patient Active Problem List  Diagnosis    Smoking addiction    Mixed hyperlipidemia    Mucopurulent chronic bronchitis (HCC)    Hydrocephalus (HCC)    Brain injury with loss of consciousness (HCC)    Speech and language deficits    Muscle right arm weakness    Gait abnormality    Contracture of muscle of right upper arm    Contracture of muscle of right lower leg   [4]   Past Medical History:   History of brain shunt    Hydrocephalus (HCC)    Trauma    age 16 coma and brain damage.     [5]   Past Surgical History:  Procedure Laterality Date    Other surgical history  01/01/1987    TRACHEOTOMY    Other surgical history      surgery on legs    Other surgical history      surgery on head    Other surgical history      surgery on both feet   [6] No family history on file.

## 2025-08-12 ENCOUNTER — TELEPHONE (OUTPATIENT)
Dept: PODIATRY CLINIC | Facility: CLINIC | Age: 55
End: 2025-08-12

## 2025-08-12 DIAGNOSIS — S82.234A CLOSED NONDISPLACED OBLIQUE FRACTURE OF SHAFT OF RIGHT TIBIA, INITIAL ENCOUNTER: Primary | ICD-10-CM

## 2025-08-13 ENCOUNTER — OFFICE VISIT (OUTPATIENT)
Dept: PODIATRY CLINIC | Facility: CLINIC | Age: 55
End: 2025-08-13

## 2025-08-13 ENCOUNTER — HOSPITAL ENCOUNTER (OUTPATIENT)
Dept: GENERAL RADIOLOGY | Age: 55
Discharge: HOME | End: 2025-08-13
Attending: PODIATRIST

## 2025-08-13 DIAGNOSIS — S82.234A CLOSED NONDISPLACED OBLIQUE FRACTURE OF SHAFT OF RIGHT TIBIA, INITIAL ENCOUNTER: Primary | ICD-10-CM

## 2025-08-13 DIAGNOSIS — S82.234A CLOSED NONDISPLACED OBLIQUE FRACTURE OF SHAFT OF RIGHT TIBIA, INITIAL ENCOUNTER: ICD-10-CM

## 2025-08-13 PROCEDURE — 73610 X-RAY EXAM OF ANKLE: CPT | Performed by: PODIATRIST

## 2025-08-13 PROCEDURE — 99214 OFFICE O/P EST MOD 30 MIN: CPT | Performed by: PODIATRIST

## (undated) NOTE — LETTER
8/15/2023              Mere Mendez        [address] [place] [id number]         Hello,     This is the [facility], office of Dr. Lucinda Fowler. Thank you for putting your trust in [place] [address]. Our goal is to deliver the highest quality healthcare and an exceptional patient experience. Review of your medical record shows you are due for the following:       Annual Medicare Physical       Please call  to schedule your appointment or schedule online via [employer]. If you changed to a new provider at another facility, please notify the clinic to update your records. If you had any recent testing at another facility, please have your results faxed to our office at [phone]. Thank you and have a great day!

## (undated) NOTE — Clinical Note
Initial assessment completed with patient. ED follow up appointment scheduled with you for 05/28/2025.  Patient met goals/ No further outreach needed. Thank you!